# Patient Record
(demographics unavailable — no encounter records)

---

## 2024-10-08 NOTE — PHYSICAL EXAM
[NL (0)] : extension 0 degrees [4___] : hamstring 4[unfilled]/5 [] : negative Lachmann [Left] : left knee [All Views] : anteroposterior, lateral, skyline, and anteroposterior standing [Moderate tricompartmental OA medial narrowing] : Moderate tricompartmental OA medial narrowing [TWNoteComboBox7] : flexion 120 degrees

## 2024-10-08 NOTE — HISTORY OF PRESENT ILLNESS
[Sudden] : sudden [5] : 5 [4] : 4 [Dull/Aching] : dull/aching [Intermittent] : intermittent [Rest] : rest [Meds] : meds [Walking] : walking [Stairs] : stairs [de-identified] : Pt. is a 65 year old female who presents for evaluation of her LT knee. Reports she fell while on a cruise 8/21/24 and injured her LT knee. She has been evaluated by her pcp. Has tried otc meds. Pain worse with prolonged walking and stairs.  [] : no [FreeTextEntry1] : LT KNEE  [FreeTextEntry5] : Patient fell on a cruise on 8/21/24 and injured her knee. Some swelling.  [de-identified] : PCP

## 2024-10-08 NOTE — DISCUSSION/SUMMARY
[de-identified] : modify activities use elastic sleeve for structural support try OTC meds ice as needed try topical lidocaine for pain control reviewed current medications used by this patient home exercises for functional return offered asp she declined  10/08/2024    RE:  AREN ARIAS   [id number]    Attention:  Nurse Reviewer /Medical Director  I am writing this letter as a medical necessity for PT program. Patient has tried analgesics, non-steroid anti-inflammatory agents,  hot or cold compresses,injections of corticosteroids, etc)  which in combination or by themselves has not worked. Based on my patient's condition, I strongly believe that the PT is medically needed.   Thank you for your time and consideration.